# Patient Record
Sex: MALE | Race: OTHER | Employment: OTHER | URBAN - METROPOLITAN AREA
[De-identification: names, ages, dates, MRNs, and addresses within clinical notes are randomized per-mention and may not be internally consistent; named-entity substitution may affect disease eponyms.]

---

## 2017-01-04 ENCOUNTER — APPOINTMENT (OUTPATIENT)
Dept: GENERAL RADIOLOGY | Age: 52
End: 2017-01-04
Attending: PHYSICIAN ASSISTANT
Payer: OTHER GOVERNMENT

## 2017-01-04 ENCOUNTER — HOSPITAL ENCOUNTER (EMERGENCY)
Age: 52
Discharge: HOME | End: 2017-01-04
Attending: EMERGENCY MEDICINE
Payer: OTHER GOVERNMENT

## 2017-01-04 VITALS
OXYGEN SATURATION: 97 % | BODY MASS INDEX: 32.93 KG/M2 | SYSTOLIC BLOOD PRESSURE: 123 MMHG | HEART RATE: 80 BPM | TEMPERATURE: 99.1 F | DIASTOLIC BLOOD PRESSURE: 73 MMHG | HEIGHT: 70 IN | RESPIRATION RATE: 9 BRPM | WEIGHT: 230 LBS

## 2017-01-04 DIAGNOSIS — J10.1 INFLUENZA A: Primary | ICD-10-CM

## 2017-01-04 DIAGNOSIS — Z87.09 HISTORY OF COPD: ICD-10-CM

## 2017-01-04 LAB
ALBUMIN SERPL BCP-MCNC: 3.6 G/DL (ref 3.4–5)
ALBUMIN/GLOB SERPL: 0.9 {RATIO} (ref 0.8–1.7)
ALP SERPL-CCNC: 85 U/L (ref 45–117)
ALT SERPL-CCNC: 34 U/L (ref 16–61)
ANION GAP BLD CALC-SCNC: 9 MMOL/L (ref 3–18)
AST SERPL W P-5'-P-CCNC: 21 U/L (ref 15–37)
BASOPHILS # BLD AUTO: 0 K/UL (ref 0–0.06)
BASOPHILS # BLD: 0 % (ref 0–2)
BILIRUB SERPL-MCNC: 1 MG/DL (ref 0.2–1)
BUN SERPL-MCNC: 13 MG/DL (ref 7–18)
BUN/CREAT SERPL: 14 (ref 12–20)
CALCIUM SERPL-MCNC: 8.4 MG/DL (ref 8.5–10.1)
CHLORIDE SERPL-SCNC: 99 MMOL/L (ref 100–108)
CK MB CFR SERPL CALC: ABNORMAL % (ref 0–4)
CK MB SERPL-MCNC: <0.5 NG/ML (ref 0.5–3.6)
CK SERPL-CCNC: 93 U/L (ref 39–308)
CO2 SERPL-SCNC: 25 MMOL/L (ref 21–32)
CREAT SERPL-MCNC: 0.94 MG/DL (ref 0.6–1.3)
DIFFERENTIAL METHOD BLD: ABNORMAL
EOSINOPHIL # BLD: 0.1 K/UL (ref 0–0.4)
EOSINOPHIL NFR BLD: 1 % (ref 0–5)
ERYTHROCYTE [DISTWIDTH] IN BLOOD BY AUTOMATED COUNT: 14 % (ref 11.6–14.5)
FLUAV AG NPH QL IA: POSITIVE
FLUBV AG NOSE QL IA: NEGATIVE
GLOBULIN SER CALC-MCNC: 3.9 G/DL (ref 2–4)
GLUCOSE SERPL-MCNC: 125 MG/DL (ref 74–99)
HCT VFR BLD AUTO: 40.8 % (ref 36–48)
HGB BLD-MCNC: 13.8 G/DL (ref 13–16)
LYMPHOCYTES # BLD AUTO: 11 % (ref 21–52)
LYMPHOCYTES # BLD: 1.1 K/UL (ref 0.9–3.6)
MCH RBC QN AUTO: 28.2 PG (ref 24–34)
MCHC RBC AUTO-ENTMCNC: 33.8 G/DL (ref 31–37)
MCV RBC AUTO: 83.3 FL (ref 74–97)
MONOCYTES # BLD: 1.1 K/UL (ref 0.05–1.2)
MONOCYTES NFR BLD AUTO: 11 % (ref 3–10)
NEUTS SEG # BLD: 8 K/UL (ref 1.8–8)
NEUTS SEG NFR BLD AUTO: 77 % (ref 40–73)
PLATELET # BLD AUTO: 148 K/UL (ref 135–420)
PMV BLD AUTO: 10.1 FL (ref 9.2–11.8)
POTASSIUM SERPL-SCNC: 4.1 MMOL/L (ref 3.5–5.5)
PROT SERPL-MCNC: 7.5 G/DL (ref 6.4–8.2)
RBC # BLD AUTO: 4.9 M/UL (ref 4.7–5.5)
SODIUM SERPL-SCNC: 133 MMOL/L (ref 136–145)
TROPONIN I SERPL-MCNC: <0.02 NG/ML (ref 0–0.06)
WBC # BLD AUTO: 10.4 K/UL (ref 4.6–13.2)

## 2017-01-04 PROCEDURE — 99283 EMERGENCY DEPT VISIT LOW MDM: CPT

## 2017-01-04 PROCEDURE — 80053 COMPREHEN METABOLIC PANEL: CPT | Performed by: PHYSICIAN ASSISTANT

## 2017-01-04 PROCEDURE — 74011000250 HC RX REV CODE- 250: Performed by: PHYSICIAN ASSISTANT

## 2017-01-04 PROCEDURE — 96361 HYDRATE IV INFUSION ADD-ON: CPT

## 2017-01-04 PROCEDURE — 82550 ASSAY OF CK (CPK): CPT | Performed by: PHYSICIAN ASSISTANT

## 2017-01-04 PROCEDURE — 85025 COMPLETE CBC W/AUTO DIFF WBC: CPT | Performed by: PHYSICIAN ASSISTANT

## 2017-01-04 PROCEDURE — 87804 INFLUENZA ASSAY W/OPTIC: CPT | Performed by: PHYSICIAN ASSISTANT

## 2017-01-04 PROCEDURE — 74011250636 HC RX REV CODE- 250/636: Performed by: PHYSICIAN ASSISTANT

## 2017-01-04 PROCEDURE — 96374 THER/PROPH/DIAG INJ IV PUSH: CPT

## 2017-01-04 PROCEDURE — 94640 AIRWAY INHALATION TREATMENT: CPT

## 2017-01-04 PROCEDURE — 71020 XR CHEST PA LAT: CPT

## 2017-01-04 PROCEDURE — 77030013140 HC MSK NEB VYRM -A

## 2017-01-04 PROCEDURE — 93005 ELECTROCARDIOGRAM TRACING: CPT

## 2017-01-04 RX ORDER — ALBUTEROL SULFATE 90 UG/1
2 AEROSOL, METERED RESPIRATORY (INHALATION)
Qty: 1 INHALER | Refills: 1 | Status: SHIPPED | OUTPATIENT
Start: 2017-01-04

## 2017-01-04 RX ORDER — METOPROLOL TARTRATE 25 MG/1
25 TABLET, FILM COATED ORAL 2 TIMES DAILY
COMMUNITY

## 2017-01-04 RX ORDER — METFORMIN HYDROCHLORIDE 500 MG/1
TABLET ORAL 2 TIMES DAILY WITH MEALS
COMMUNITY

## 2017-01-04 RX ORDER — PREDNISONE 20 MG/1
40 TABLET ORAL DAILY
Qty: 10 TAB | Refills: 0 | Status: SHIPPED | OUTPATIENT
Start: 2017-01-04 | End: 2017-01-09

## 2017-01-04 RX ORDER — ATORVASTATIN CALCIUM 10 MG/1
10 TABLET, FILM COATED ORAL DAILY
COMMUNITY

## 2017-01-04 RX ORDER — GUAIFENESIN 100 MG/5ML
81 LIQUID (ML) ORAL DAILY
COMMUNITY

## 2017-01-04 RX ORDER — SODIUM CHLORIDE 9 MG/ML
1000 INJECTION, SOLUTION INTRAVENOUS ONCE
Status: COMPLETED | OUTPATIENT
Start: 2017-01-04 | End: 2017-01-04

## 2017-01-04 RX ORDER — OMEPRAZOLE 20 MG/1
20 CAPSULE, DELAYED RELEASE ORAL DAILY
COMMUNITY

## 2017-01-04 RX ORDER — IPRATROPIUM BROMIDE AND ALBUTEROL SULFATE 2.5; .5 MG/3ML; MG/3ML
3 SOLUTION RESPIRATORY (INHALATION)
Status: COMPLETED | OUTPATIENT
Start: 2017-01-04 | End: 2017-01-04

## 2017-01-04 RX ORDER — CODEINE PHOSPHATE AND GUAIFENESIN 10; 100 MG/5ML; MG/5ML
5 SOLUTION ORAL
Qty: 160 ML | Refills: 0 | Status: SHIPPED | OUTPATIENT
Start: 2017-01-04

## 2017-01-04 RX ORDER — AZELASTINE 1 MG/ML
1 SPRAY, METERED NASAL 2 TIMES DAILY
COMMUNITY

## 2017-01-04 RX ADMIN — IPRATROPIUM BROMIDE AND ALBUTEROL SULFATE 3 ML: .5; 3 SOLUTION RESPIRATORY (INHALATION) at 09:55

## 2017-01-04 RX ADMIN — METHYLPREDNISOLONE SODIUM SUCCINATE 125 MG: 125 INJECTION, POWDER, FOR SOLUTION INTRAMUSCULAR; INTRAVENOUS at 09:55

## 2017-01-04 RX ADMIN — SODIUM CHLORIDE 1000 ML: 9 INJECTION, SOLUTION INTRAVENOUS at 09:33

## 2017-01-04 NOTE — ED TRIAGE NOTES
Patient was diagnosed with pneumonia at [facility] and placed on Zithromax. Patient woke this am with a spike in his fever. Sepsis Screening completed    (  )Patient meets SIRS criteria. ( x )Patient does not meet SIRS criteria.       SIRS Criteria is achieved when two or more of the following are present   Temperature < 96.8°F (36°C) or > 100.9°F (38.3°C)   Heart Rate > 90 beats per minute   Respiratory Rate > 20 beats per minute   WBC count > 12,000 or <4,000 or > 10% bands

## 2017-01-04 NOTE — LETTER
[facility] [place] 
[facility] 
[address] [address] 
[phone] Work/School Note Date: 1/4/2017 To Whom It May concern: 
 
Delroy Aj was seen and treated today in the emergency room for the flu by the following provider(s): 
Attending Provider: Lety Devine MD 
Physician Assistant: Mony Boyle PA-C. Delroy Aj may return to work on Monday, 1/9/2017. Sincerely, Oliva Arshad PA-C

## 2017-01-04 NOTE — ED PROVIDER NOTES
HPI Comments: 9:20 AM  Allison Henning is a 49 y.o. male with a h/o DM, HLD, and COPD presenting to the ED C/O fever of 102.5 F onset ~2 hours ago. Associated sx include chills, diaphoresis, and productive cough with yellowish-green phlegm. Pt was dx with PNA 2 days ago at [facility]. Pt was not placed on steroids or given an inhaler. Wife reports that he has a hx PNA. Denies use of home oxygen. Former tobacco user, quit 9 years ago. Pt denies SOB, CP hx of HTN and any other Sx or complaints. Patient is a 49 y.o. male presenting with fever. The history is provided by the patient and the spouse. No  was used. Fever    This is a recurrent problem. The current episode started 1 to 2 hours ago. The maximum temperature noted was 102 - 102.9 F. Associated symptoms include cough (productive). Pertinent negatives include no chest pain, no vomiting, no congestion, no headaches, no sore throat and no shortness of breath. He has tried antibiotics for the symptoms. The treatment provided no relief. Past Medical History:   Diagnosis Date    [facility] (chronic obstructive pulmonary disease) ([facility] [address]     Diabetes ([facility] [address]     Hyperlipidemia        History reviewed. No pertinent past surgical history. History reviewed. No pertinent family history. Social History     Social History    Marital status:      Spouse name: N/A    Number of children: N/A    Years of education: N/A     Occupational History    Not on file. Social History Main Topics    Smoking status: Never Smoker    Smokeless tobacco: Not on file    Alcohol use Yes    Drug use: Not on file    Sexual activity: Not on file     Other Topics Concern    Not on file     Social History Narrative    No narrative on file         ALLERGIES: Mobic [meloxicam] and Wellbutrin [bupropion]    Review of Systems   Constitutional: Positive for chills, diaphoresis and fever.    HENT: Negative for congestion and sore throat. Respiratory: Positive for cough (productive). Negative for shortness of breath. Cardiovascular: Negative for chest pain and palpitations. Gastrointestinal: Negative for abdominal pain, nausea and vomiting. Genitourinary: Negative for dysuria, flank pain and frequency. Musculoskeletal: Negative for arthralgias, joint swelling and myalgias. Skin: Negative for color change and wound. Neurological: Negative for dizziness, weakness, light-headedness and headaches. Hematological: Negative for adenopathy. Vitals:    01/04/17 0904   BP: 123/73   Pulse: 80   Resp: 9   Temp: 99.1 °F (37.3 °C)   SpO2: 97%   Weight: 104.3 kg (230 lb)   Height: 5' 10\" (1.778 m)            Physical Exam   Constitutional: He is oriented to person, place, and time. He appears well-developed and well-nourished. No distress. HENT:   Head: Normocephalic and atraumatic. Head is without right periorbital erythema and without left periorbital erythema. Right Ear: External ear normal. No drainage or swelling. Tympanic membrane is not perforated, not erythematous and not bulging. Left Ear: External ear normal. No drainage or swelling. Tympanic membrane is not perforated, not erythematous and not bulging. Nose: Nose normal. No mucosal edema or rhinorrhea. Right sinus exhibits no maxillary sinus tenderness and no frontal sinus tenderness. Left sinus exhibits no maxillary sinus tenderness and no frontal sinus tenderness. Mouth/Throat: Uvula is midline, oropharynx is clear and moist and mucous membranes are normal. No oral lesions. No trismus in the jaw. No dental abscesses or uvula swelling. No oropharyngeal exudate, posterior oropharyngeal edema, posterior oropharyngeal erythema or tonsillar abscesses. Eyes: Conjunctivae are normal. Right eye exhibits no discharge. Left eye exhibits no discharge. No scleral icterus. Neck: Normal range of motion. Neck supple.    Cardiovascular: Normal rate, regular rhythm, normal heart sounds and intact distal pulses. Exam reveals no gallop and no friction rub. No murmur heard. Pulmonary/Chest: Effort normal and breath sounds normal. No accessory muscle usage. No tachypnea. No respiratory distress. He has no decreased breath sounds. He has no wheezes. He has no rhonchi. He has no rales. Abdominal: Soft. Bowel sounds are normal. He exhibits no distension. There is no tenderness. Musculoskeletal: Normal range of motion. He exhibits no edema or tenderness. Lymphadenopathy:     He has no cervical adenopathy. Neurological: He is alert and oriented to person, place, and time. Skin: Skin is warm and dry. He is not diaphoretic. Psychiatric: He has a normal mood and affect. Judgment normal.   Nursing note and vitals reviewed. RESULTS:    EKG interpretation: (Preliminary)  NSR at 83 bpm. Normal ECG. No WANDY.   EKG read by Lyudmila Huitron PA-C at 9:41 AM.     XR CHEST PA LAT   Final Result    Low lung volumes, no active disease.     As read by the radiologist.           Labs Reviewed   INFLUENZA A & B AG (RAPID TEST) - Abnormal; Notable for the following:        Result Value    Influenza A Antigen POSITIVE (*)     All other components within normal limits   CBC WITH AUTOMATED DIFF - Abnormal; Notable for the following:     NEUTROPHILS 77 (*)     LYMPHOCYTES 11 (*)     MONOCYTES 11 (*)     All other components within normal limits   METABOLIC PANEL, COMPREHENSIVE - Abnormal; Notable for the following:     Sodium 133 (*)     Chloride 99 (*)     Glucose 125 (*)     Calcium 8.4 (*)     All other components within normal limits   CARDIAC PANEL,(CK, CKMB & TROPONIN) - Abnormal; Notable for the following:     CK - MB <0.5 (*)     All other components within normal limits       Recent Results (from the past 12 hour(s))   EKG, 12 LEAD, INITIAL    Collection Time: 01/04/17  9:41 AM   Result Value Ref Range    Ventricular Rate 83 BPM    Atrial Rate 83 BPM    P-R Interval 148 ms    QRS Duration 104 ms    Q-T Interval 372 ms    QTC Calculation (Bezet) 437 ms    Calculated P Axis 46 degrees    Calculated R Axis 10 degrees    Calculated T Axis 19 degrees    Diagnosis       Normal sinus rhythm  Normal ECG  No previous ECGs available     CBC WITH AUTOMATED DIFF    Collection Time: 01/04/17 10:00 AM   Result Value Ref Range    WBC 10.4 4.6 - 13.2 K/uL    RBC 4.90 4.70 - 5.50 M/uL    HGB 13.8 13.0 - 16.0 g/dL    HCT 40.8 36.0 - 48.0 %    MCV 83.3 74.0 - 97.0 FL    MCH 28.2 24.0 - 34.0 PG    MCHC 33.8 31.0 - 37.0 g/dL    RDW 14.0 11.6 - 14.5 %    PLATELET 535 702 - 104 K/uL    MPV 10.1 9.2 - 11.8 FL    NEUTROPHILS 77 (H) 40 - 73 %    LYMPHOCYTES 11 (L) 21 - 52 %    MONOCYTES 11 (H) 3 - 10 %    EOSINOPHILS 1 0 - 5 %    BASOPHILS 0 0 - 2 %    ABS. NEUTROPHILS 8.0 1.8 - 8.0 K/UL    ABS. LYMPHOCYTES 1.1 0.9 - 3.6 K/UL    ABS. MONOCYTES 1.1 0.05 - 1.2 K/UL    ABS. EOSINOPHILS 0.1 0.0 - 0.4 K/UL    ABS. BASOPHILS 0.0 0.0 - 0.06 K/UL    DF AUTOMATED     METABOLIC PANEL, COMPREHENSIVE    Collection Time: 01/04/17 10:00 AM   Result Value Ref Range    Sodium 133 (L) 136 - 145 mmol/L    Potassium 4.1 3.5 - 5.5 mmol/L    Chloride 99 (L) 100 - 108 mmol/L    CO2 25 21 - 32 mmol/L    Anion gap 9 3.0 - 18 mmol/L    Glucose 125 (H) 74 - 99 mg/dL    BUN 13 7.0 - 18 MG/DL    Creatinine 0.94 0.6 - 1.3 MG/DL    BUN/Creatinine ratio 14 12 - 20      GFR est AA >60 >60 ml/min/1.73m2    GFR est non-AA >60 >60 ml/min/1.73m2    Calcium 8.4 (L) 8.5 - 10.1 MG/DL    Bilirubin, total 1.0 0.2 - 1.0 MG/DL    ALT 34 16 - 61 U/L    AST 21 15 - 37 U/L    Alk.  phosphatase 85 45 - 117 U/L    Protein, total 7.5 6.4 - 8.2 g/dL    Albumin 3.6 3.4 - 5.0 g/dL    Globulin 3.9 2.0 - 4.0 g/dL    A-G Ratio 0.9 0.8 - 1.7     INFLUENZA A & B AG (RAPID TEST)    Collection Time: 01/04/17 10:00 AM   Result Value Ref Range    Influenza A Antigen POSITIVE (A) NEG      Influenza B Antigen NEGATIVE  NEG     CARDIAC PANEL,(CK, CKMB & TROPONIN)    Collection Time: 01/04/17 10:00 AM   Result Value Ref Range    CK 93 39 - 308 U/L    CK - MB <0.5 (L) 0.5 - 3.6 ng/ml    CK-MB Index CANNOT BE CALCULATED 0.0 - 4.0 %    Troponin-I, Qt. <0.02 0.00 - 0.06 NG/ML       MDM  Number of Diagnoses or Management Options     Amount and/or Complexity of Data Reviewed  Clinical lab tests: reviewed and ordered  Tests in the radiology section of CPT®: reviewed and ordered (CXR)  Tests in the medicine section of CPT®: reviewed and ordered (EKG)  Independent visualization of images, tracings, or specimens: yes (EKG, CXR)      ED Course     Medications   methylPREDNISolone (PF) (SOLU-MEDROL) injection 125 mg (125 mg IntraVENous Given 1/4/17 0955)   0.9% sodium chloride infusion 1,000 mL (0 mL IntraVENous IV Completed 1/4/17 1044)   albuterol-ipratropium (DUO-NEB) 2.5 MG-0.5 MG/3 ML (3 mL Nebulization Given 1/4/17 0955)       Procedures    PROGRESS NOTE:   9:20 AM  Initial assessment completed. Written by Nino Craig ED Scribe, as dictated by Ela Addison PA-C.       11:09 AM Olivia Rocaer TO FACE  ED ATTENDING NOTE:    I have reviewed the documentation provided by the MLP, discussed their findings, clinical impression, and the proposed management plans with regards to this patient's encounter. I have personally evaluated the patient in the emergency department to verify the history and confirm physical findings. I have also reviewed the pertinent lab and/or radiology studies. Based on this evaluation my clinical impression is febrile illness - probably influenza. Recommendations:  Specific input or recommendations to the management of the patient are as follows: discharge to home with symptomatic treatment and appropriate follow-up . Cordelia Lopez MD      DISCHARGE NOTE:  11:32 AM  Radha Lexiijerardo ChatmanKhan's  results have been reviewed with him. He has been counseled regarding his diagnosis, treatment, and plan.   He verbally conveys understanding and agreement of the signs, symptoms, diagnosis, treatment and prognosis and additionally agrees to follow up as discussed. He also agrees with the care-plan and conveys that all of his questions have been answered. I have also provided discharge instructions for him that include: educational information regarding their diagnosis and treatment, and list of reasons why they would want to return to the ED prior to their follow-up appointment, should his condition change. The patient and/or family have been provided with education for proper Emergency Department utilization. CLINICAL IMPRESSION:    1. Influenza A    2. History of COPD        AFTER VISIT PLAN:    Discharge Medication List as of 1/4/2017 11:32 AM      START taking these medications    Details   predniSONE (DELTASONE) 20 mg tablet Take 2 Tabs by mouth daily for 5 days. With Breakfast, Print, Disp-10 Tab, R-0      albuterol (PROVENTIL HFA, VENTOLIN HFA, PROAIR HFA) 90 mcg/actuation inhaler Take 2 Puffs by inhalation every four (4) hours as needed for Wheezing or Shortness of Breath., Print, Disp-1 Inhaler, R-1      inhalational spacing device 1 Each by Does Not Apply route as needed. Please dispense one adult spacer to be used with albuterol HFA. , Print, Disp-1 Device, R-0      guaiFENesin-codeine (ROBITUSSIN AC) 100-10 mg/5 mL solution Take 5 mL by mouth three (3) times daily as needed for Cough. Max Daily Amount: 15 mL. , Print, Disp-160 mL, R-0         CONTINUE these medications which have NOT CHANGED    Details   TAMSULOSIN HCL (TAMSULOSIN PO) Take  by mouth., Historical Med      aspirin 81 mg chewable tablet Take 81 mg by mouth daily. , Historical Med      atorvastatin (LIPITOR) 10 mg tablet Take 10 mg by mouth daily. , Historical Med      metFORMIN (GLUCOPHAGE) 500 mg tablet Take  by mouth two (2) times daily (with meals). , Historical Med      omeprazole (PRILOSEC) 20 mg capsule Take 20 mg by mouth daily. , Historical Med      metoprolol tartrate (LOPRESSOR) 25 mg tablet Take 25 mg by mouth two (2) times a day., Historical Med      fluticasone (FLOVENT DISKUS) 50 mcg/actuation inhaler Take  by inhalation. , Historical Med      azelastine (ASTELIN) 137 mcg (0.1 %) nasal spray 1 Spray two (2) times a day. Use in each nostril as directed, Historical Med              Follow-up Information     Follow up With Details Comments Contact Info    Phys Other, MD   Patient can only remember the practice name and not the physician      Mark Carrasco, DO Schedule an appointment as soon as possible for a visit in 2 days Follow up with your primary are physician. [address]      [facility]  As needed, If symptoms worsen [address] [place] [address] [phone]           Attestations: This note is prepared by Selina Davidson, acting as Scribe for Martha Monsivais PA-C. Martha Monsivais PA-C: The scribe's documentation has been prepared under my direction and personally reviewed by me in its entirety. I confirm that the note above accurately reflects all work, treatment, procedures, and medical decision making performed by me.

## 2017-01-06 LAB
ATRIAL RATE: 83 BPM
CALCULATED P AXIS, ECG09: 46 DEGREES
CALCULATED R AXIS, ECG10: 10 DEGREES
CALCULATED T AXIS, ECG11: 19 DEGREES
DIAGNOSIS, 93000: NORMAL
P-R INTERVAL, ECG05: 148 MS
Q-T INTERVAL, ECG07: 372 MS
QRS DURATION, ECG06: 104 MS
QTC CALCULATION (BEZET), ECG08: 437 MS
VENTRICULAR RATE, ECG03: 83 BPM

## 2017-08-02 NOTE — DISCHARGE INSTRUCTIONS
Influenza (Flu): Care Instructions  Your Care Instructions  Influenza (flu) is an infection in the lungs and breathing passages. It is caused by the influenza virus. There are different strains, or types, of the flu virus from year to year. Unlike the common cold, the flu comes on suddenly and the symptoms, such as a cough, congestion, fever, chills, fatigue, aches, and pains, are more severe. These symptoms may last up to 10 days. Although the flu can make you feel very sick, it usually doesn't cause serious health problems. Home treatment is usually all you need for flu symptoms. But your doctor may prescribe antiviral medicine to prevent other health problems, such as pneumonia, from developing. Older people and those who have a long-term health condition, such as lung disease, are most at risk for having pneumonia or other health problems. Follow-up care is a key part of your treatment and safety. Be sure to make and go to all appointments, and call your doctor if you are having problems. Its also a good idea to know your test results and keep a list of the medicines you take. How can you care for yourself at home? · Get plenty of rest.  · Drink plenty of fluids, enough so that your urine is light yellow or clear like water. If you have kidney, heart, or liver disease and have to limit fluids, talk with your doctor before you increase the amount of fluids you drink. · Take an over-the-counter pain medicine if needed, such as acetaminophen (Tylenol), ibuprofen (Advil, Motrin), or naproxen (Aleve), to relieve fever, headache, and muscle aches. Read and follow all instructions on the label. No one younger than 20 should take aspirin. It has been linked to Reye syndrome, a serious illness. · Do not smoke. Smoking can make the flu worse. If you need help quitting, talk to your doctor about stop-smoking programs and medicines. These can increase your chances of quitting for good.   · Breathe moist air from a hot shower or from a sink filled with hot water to help clear a stuffy nose. · Before you use cough and cold medicines, check the label. These medicines may not be safe for young children or for people with certain health problems. · If the skin around your nose and lips becomes sore, put some petroleum jelly on the area. · To ease coughing:  ¨ Drink fluids to soothe a scratchy throat. ¨ Suck on cough drops or plain hard candy. ¨ Take an over-the-counter cough medicine that contains dextromethorphan to help you get some sleep. Read and follow all instructions on the label. ¨ Raise your head at night with an extra pillow. This may help you rest if coughing keeps you awake. · Take any prescribed medicine exactly as directed. Call your doctor if you think you are having a problem with your medicine. To avoid spreading the flu  · Wash your hands regularly, and keep your hands away from your face. · Stay home from school, work, and other public places until you are feeling better and your fever has been gone for at least 24 hours. The fever needs to have gone away on its own without the help of medicine. · Ask people living with you to talk to their doctors about preventing the flu. They may get antiviral medicine to keep from getting the flu from you. · To prevent the flu in the future, get a flu vaccine every fall. Encourage people living with you to get the vaccine. · Cover your mouth when you cough or sneeze. When should you call for help? Call 911 anytime you think you may need emergency care. For example, call if:  · You have severe trouble breathing. Call your doctor now or seek immediate medical care if:  · You have new or worse trouble breathing. · You seem to be getting much sicker. · You feel very sleepy or confused. · You have a new or higher fever. · You get a new rash.   Watch closely for changes in your health, and be sure to contact your doctor if:  · You begin to get better and then get worse. · You are not getting better after 1 week. Where can you learn more? Go to http://chani-aleks.info/. Enter G090 in the search box to learn more about \"Influenza (Flu): Care Instructions. \"  Current as of: May 23, 2016  Content Version: 11.1  © 3813-1634 [employer]. Care instructions adapted under license by [employer] (which disclaims liability or warranty for this information). If you have questions about a medical condition or this instruction, always ask your healthcare professional. [address] any warranty or liability for your use of this information. Yes